# Patient Record
Sex: FEMALE | Employment: OTHER | URBAN - METROPOLITAN AREA
[De-identification: names, ages, dates, MRNs, and addresses within clinical notes are randomized per-mention and may not be internally consistent; named-entity substitution may affect disease eponyms.]

---

## 2019-05-21 ENCOUNTER — SEE NOTE (OUTPATIENT)
Dept: URBAN - METROPOLITAN AREA CLINIC 32 | Facility: CLINIC | Age: 64
Setting detail: DERMATOLOGY
End: 2019-05-21

## 2019-05-21 DIAGNOSIS — L28.0 LICHEN SIMPLEX CHRONICUS: ICD-10-CM

## 2019-05-21 PROCEDURE — 99202 OFFICE O/P NEW SF 15 MIN: CPT

## 2021-02-22 ENCOUNTER — RX ONLY (RX ONLY)
Age: 66
End: 2021-02-22

## 2021-02-22 ENCOUNTER — SEE NOTE (OUTPATIENT)
Dept: URBAN - METROPOLITAN AREA CLINIC 32 | Facility: CLINIC | Age: 66
Setting detail: DERMATOLOGY
End: 2021-02-22

## 2021-02-22 PROCEDURE — 99214 OFFICE O/P EST MOD 30 MIN: CPT

## 2021-02-22 RX ORDER — HYDROCORTISONE 25 MG/G
1 APPLICATION CREAM TOPICAL BID
Qty: 30 | Refills: 0
Start: 2021-02-22

## 2022-05-16 ENCOUNTER — RX ONLY (RX ONLY)
Age: 67
End: 2022-05-16

## 2022-05-16 ENCOUNTER — RASH (OUTPATIENT)
Dept: URBAN - METROPOLITAN AREA CLINIC 32 | Facility: CLINIC | Age: 67
Setting detail: DERMATOLOGY
End: 2022-05-16

## 2022-05-16 DIAGNOSIS — L70.0 ACNE VULGARIS: ICD-10-CM

## 2022-05-16 DIAGNOSIS — L82.1 OTHER SEBORRHEIC KERATOSIS: ICD-10-CM

## 2022-05-16 DIAGNOSIS — D22.5 MELANOCYTIC NEVI OF TRUNK: ICD-10-CM

## 2022-05-16 DIAGNOSIS — L56.8 OTHER SPECIFIED ACUTE SKIN CHANGES DUE TO ULTRAVIOLET RADIATION: ICD-10-CM

## 2022-05-16 PROCEDURE — 99213 OFFICE O/P EST LOW 20 MIN: CPT

## 2022-05-16 RX ORDER — FLUTICASONE PROPIONATE 0.05 MG/G
1 A SMALL AMOUNT OINTMENT TOPICAL TWICE A DAY
Qty: 30 | Refills: 0
Start: 2022-05-16

## 2023-06-12 ENCOUNTER — APPOINTMENT (OUTPATIENT)
Dept: URBAN - METROPOLITAN AREA CLINIC 208 | Age: 68
Setting detail: DERMATOLOGY
End: 2023-06-13

## 2023-06-12 DIAGNOSIS — L26 EXFOLIATIVE DERMATITIS: ICD-10-CM

## 2023-06-12 PROCEDURE — OTHER PRESCRIPTION: OTHER

## 2023-06-12 PROCEDURE — 99212 OFFICE O/P EST SF 10 MIN: CPT

## 2023-06-12 PROCEDURE — OTHER COUNSELING: OTHER

## 2023-06-12 PROCEDURE — OTHER MIPS QUALITY: OTHER

## 2023-06-12 RX ORDER — UREA 450 MG/G
LOTION TOPICAL
Qty: 227 | Refills: 3 | Status: ERX | COMMUNITY
Start: 2023-06-12

## 2023-06-12 NOTE — HPI: RASH
Is The Patient Presenting As Previously Scheduled?: Yes
How Severe Is Your Rash?: moderate
Is This A New Presentation, Or A Follow-Up?: Rash
Additional History: Patient states comes on and off for a while, doing better today.  Hands will just peel, patient will usually just moisturize

## 2023-06-15 ENCOUNTER — RX ONLY (RX ONLY)
Age: 68
End: 2023-06-15

## 2023-06-15 RX ORDER — UREA 40 %
CREAM (GRAM) TOPICAL
Qty: 28 | Refills: 3 | Status: ERX | COMMUNITY
Start: 2023-06-15